# Patient Record
Sex: FEMALE | Race: WHITE
[De-identification: names, ages, dates, MRNs, and addresses within clinical notes are randomized per-mention and may not be internally consistent; named-entity substitution may affect disease eponyms.]

---

## 2020-01-14 NOTE — REP
Clinical:  Anatomical evaluation.

 

Comparison: None .

 

Findings:

Examination demonstrates a single live intrauterine pregnancy in breech

presentation.  Fetal motion is identified by technologist.  Placenta is noted

posterior and grade zero without evidence for placenta previa or abruption.

Amniotic fluid volume is normal.  Cervix measures 4.4 cm in length and appears

closed.  No evidence for nuchal cord.

 

Gestational age by LMP 18 weeks 3 days with JONATAN 06/12/2020 .

Gestational age by current measurements 19 weeks 1 day with JONATAN 06/07/2020 .

 

FHR equals 147 beats per minute.

BPD  4.4 cm     19 weeks 2 days

HC  16.1 cm     18 weeks 6 days

AC  14.0 cm     19 weeks 3 days

FL  2.8 cm      18 weeks 4 days

HL  2.9 cm      19 weeks 3 days

HC/AC ratio  1.15

 

Estimated fetal weight 271 grams ( 72nd percentile).

 

Anatomical assessment demonstrates normal structures including cranium, choroid

plexus, cavum, cerebellum/posterior fossa, facial features, lungs,diaphragm,

stomach, cord insertion/three-vessel cord, kidneys/bladder, spine, and

extremities.

 

Limited evaluation of the heart/ventricular outflow tracts and feet due to

positioning.

 

Impression:

1.  Single live intrauterine pregnancy in breech presentation demonstrating

appropriate interval growth.

2.  Anatomical limitations as noted above may warrant followup.

 

 

Electronically Signed by

Norberto Batista MD 01/14/2020 02:11 A

## 2020-03-26 NOTE — REP
OB ULTRASOUND:

 

Real-time sonographic evaluation of the gravid uterus is performed.  There is a

single living intrauterine gestation.  Estimated gestational age 28 weeks 6 days,

EDC 06/12/2020.  Today's measurements indicate appropriate growth.

 

BPD                      78 mm = 31 weeks 2 days, 84th percentile

 

HC                     271 mm = 29 weeks 4 days, 63rd percentile

 

AC                     258 mm = 30 weeks 0 days, 72nd percentile

 

Femur length             55 mm = 29 weeks 0 days, 53rd percentile

 

HC/AC ratio 1.05 within normal range of 0.99-1.18.  Estimated fetal weight 1442

grams, 61st percentile.  Cervix is closed and measures 3.4 cm in length.  Fetal

heart rate 136 beats per minute.  Amniotic fluid within normal limits, PHILIP 19.0

within normal range of 9.1-23.1.

 

   SEEN/GROSSLY UNREMARKABLE

 

Lateral ventricles                       Yes

 

Posterior fossa                          Yes

 

Upper lip                                    Yes

 

Four-chamber heart                   Yes

 

LVOT                                         Yes

 

RVOT                                        Yes

 

Stomach                                    Yes

 

Cord insertion                            Yes

 

Three vessel cord                      Yes

 

Kidneys                                      Yes

 

Bladder                                      Yes

 

Spine                                         Yes

 

Fetal position:  Vertex.

 

Placenta:  Anterior and grade 0 with no previa or abruption.

 

 

Electronically Signed by

Kulwant Nye MD 03/26/2020 04:58 P

## 2020-06-12 NOTE — REP
Clinical:  Growth evaluation.

 

Comparison: 03/26/2020 .

 

Findings:

Examination demonstrates a single live intrauterine pregnancy in cephalic

presentation.  Fetal motion is identified by technologist.  Placenta is noted

posterior and grade I I without evidence for placenta previa or abruption.

Amniotic fluid volume is normal.  Cervix appears closed.

 

Gestational age by LMP 39 weeks 6 days with JONATAN 06/12/2020 .

Gestational age by current measurements 36 weeks 5 days with JONATAN 07/04/2020 .

 

FHR equals 149 beats per minute.

Estimated fetal weight 3033 grams ( 20th percentile based on age by LMP and first

ultrasound ).

 

Impression:

Single live advanced gestation in cephalic presentation demonstrating appropriate

interval growth.  No obvious abnormality appreciated.

## 2020-06-19 NOTE — HPEPDOC
Obstetrical History & Physical


General


Date of Admission


2020 at 15:55





History of Present Illness


Chief Complaint:  Induction of labor


Information Provided By:  Patient


Age:  35


:  4


Term:  3


Pre-term:  0


Abortions:  0


Living:  3





Prenatal Care


Prenatal Care:  Limited Care (entry to care at 24 weeks)





Prenatal Dating


Final EDC:  2020


Final EDC by:  LMP


EGA at Admission:  41





Past Medical History


Past Obstetrical History #1:  


   Past Obstetrical History:  Primgravida ()


   Type of Delivery:  Spontaneous Vaginal Del.


   Sex of Infant:  Male (6#6)


   Complications:  No


Past Obstetrical History #2:  


   Past Obstetrical History:  Multigravida ()


   Type of Delivery:  Spontaneous Vaginal Del.


   Sex of Infant:  Male (8#13)


   Complications:  No


Past Obstetrical History #3:  


   Past Obstetrical History:  Multigravida ()


   Type of Delivery:  Ceserean section (failed induction)


   Sex of Infant:  Male (8#8)


   Complications:  Yes (oligohydramnios)


GYN History:  No pertinent history


Past Medical History


Medical History


hypothyroidism with small goiter, anemia


Surgical History:  Appendectomy,  section





Family History


Significant Family History:  Cancer, Heart disease





Social History


Marital Status:  Single


Family situation:  Spouse/partner home


Psychosocial History:  No pertinent psych hx


* Smoker:  current smoker


Alcohol:  Denies


Drugs:  denies





Abuse Violence Screening


Have you been hit/kicked/slapp:  No


Have you been sexually assault:  No





Prenatal Imunizations


Tdap status:  declined





Allergies


Coded Allergies:  


     No Known Drug Allergies (Verified  Allergy, Unknown, 20)





Medications


Miscellaneous Medications


Acetaminophen (Mapap) 500 Mg Tablet, 1,000 MG PO





Physical Examination


Physical Examination


GENERAL: Alert and oriented times three.


BREAST: .


ABDOMEN: Gravid and non-tender to touch.


FETUS: Is vertex (VTX) by sterile vaginal examination (SVE), fetus is vertex 

(VTX) by Leopold.


HEART RATE: Regular rate and rhythm.


LUNGS: Clear to auscultation (CTA).


EXTREMITIES: No edema. No clonus. Deep tendon reflexes (DTRs) + 2.





Vital Signs/I&O





Vital Signs








  Date Time  Temp Pulse Resp B/P (MAP) Pulse Ox O2 Delivery O2 Flow Rate FiO2


 


20 16:19 97.5 86 18 122/71 (88)    











Pertinent Laboratoy Data


Blood Type:  O-


RBC Antibody Screen:  Negative


HIV:  Unknown


Hepatitis B:  Unknown


Hepatitis C:  Unknown


Rapid Plasma Reagin:  Unknown


Rubella:  Unknown


Chlamydia/Gonorrhea:  Negative


Group B Streptococcus:  Negative


Quad Screen Test:  Declined


Glucose Tolerance Test:  121





Anatomy Ultrasound


Ultrasound Date:  2020


Placenta Location:  Posterior


Normal Anatomy:  Yes


Placenta Previa:  No


Estimated Fetal Weight (grams):  271 (72%)





Other Ultrasounds


3/26/2020 f/u anatomy PHILIP 19.0, normal anatomy. Appropriate growth


2020 cephalic.  Normal fluid. EFW 3033gm, 20%





 Steroid Therapy


 Steroid Therapy:  No





Vaginal Examination


Dilation:  2cm (-3)


Effacement:  80%


Station:  -3


Cervical Consistency:  Medium


Cervical Position:  Posterior


Fetal Presentation:  Cephalic presentation





Fetal Assessment


Fetal Heart Rate (FHR):  135


Variability:  Moderate


Accelerations:  Positive


Decelerations:  Other (deceleration after being on back)





Tocometer


Contractions:  No


Multi-drug resistant Organism:  No history of MDRO





Assessment/Plan


Assessment


Sri is a 35-year-old  (G)4 para (P)3-0-0-3 at 41+0 weeks by 18-week 

ultrasound. Presents to Labor and Delivery (L&D) for induction of labor due to 

postdates. History significant for 2 vaginal deliveries followed by  

section for failed induction. She has been thoroughly counseled regarding 

risks/benefits of TOLAC vs repeat  section. Pt desires TOLAC at this 

time. She reports good fetal movement. Denies LOF, bleeding or regular UC.





Plan


Admit and orient per consult Dr Hickman


 and consent.


Diet: Clear liquids.


Group B Streptococcus (GBS) negative.


Labs and intravenous (IV) per unit protocol.


Counseled on Pitocin and induction of labor (IOL).


Lactated Ringers (LR): Bolus 500 mL, then at 125 mL/hr.


Anticipate successful vaginal birth after . 


C-S as appropriate.











Lauryn Ventura CNM  2020 17:01

## 2020-06-19 NOTE — IPNPDOC
Text Note


Date of Service


The patient was seen on 6/19/20.





NOTE


Progress





Fetal tracing has improved with IV hydration, now Cat I


Pitocin started per protocol


Cooks catheter placed, inflated with 60ml/40ml NS





Dr Hickman updated on pt status





VS,Fishbone, I+O


VS, Fishbone, I+O


Laboratory Tests


6/19/20 17:04











Vital Signs








  Date Time  Temp Pulse Resp B/P (MAP) Pulse Ox O2 Delivery O2 Flow Rate FiO2


 


6/19/20 18:27  81 18 125/63 (83)    


 


6/19/20 18:12 97.9       

















Lauryn Ventura CNM  Jun 19, 2020 19:33

## 2020-06-19 NOTE — IPNPDOC
Text Note


Date of Service


The patient was seen on 6/19/20.





NOTE


Progress





Cooks catheter out around 2030. SVE 4-5 per nursing


Pitocin was up to 6mu. Recurrent variable/late decels to 70's noted, pitocin 

decreased to 2mu.


Cat II tracing. FH recovers with variability following decel


Dr Hickman updated on pt status.





VS,Fishbone, I+O


VS, Fishbone, I+O


Laboratory Tests


6/19/20 17:04











Vital Signs








  Date Time  Temp Pulse Resp B/P (MAP) Pulse Ox O2 Delivery O2 Flow Rate FiO2


 


6/19/20 19:17  77 18 122/66 (84)  Room Air  


 


6/19/20 18:12 97.9       

















Lauryn Ventura CNM  Jun 19, 2020 23:33

## 2020-06-20 NOTE — IPNPDOC
Text Note


Date of Service


The patient was seen on 6/20/20.





NOTE


Progress 





Pt has been comfortable with epidural


Persistent variable/late decels noted with recovery, Cat II


Pitocin 2mu turned off


SVE 7-8/90/-1


AROM small amount clear fluid. FSE placed.





VS,Fishbone, I+O


VS, Fishbone, I+O


Laboratory Tests


6/19/20 17:04











Vital Signs








  Date Time  Temp Pulse Resp B/P (MAP) Pulse Ox O2 Delivery O2 Flow Rate FiO2


 


6/20/20 04:57  60 18 93/54 (67)  Room Air  


 


6/20/20 04:20 98.0       














I&O- Last 24 Hours up to 6 AM 


 


 6/20/20





 06:00


 


Intake Total 3326 ml


 


Output Total 1500 ml


 


Balance 1826 ml

















Lauryn Ventura CNM  Jun 20, 2020 05:42

## 2020-06-20 NOTE — IPNPDOC
Text Note


Date of Service


The patient was seen on 6/20/20.





NOTE


Progress





Becoming more uncomfortable


Pitocin @ 2mu


UC 2-4 minutes x 60+ seconds, moderate


FH mostly Cat I with episodes Cat II


SVE 6/80/-2, moderate bloody show





Pt is considering epidural





VS,Fishbone, I+O


VS, Fishbone, I+O


Laboratory Tests


6/19/20 17:04











Vital Signs








  Date Time  Temp Pulse Resp B/P (MAP) Pulse Ox O2 Delivery O2 Flow Rate FiO2


 


6/20/20 00:26  62 18 106/63 (77)  Room Air  


 


6/19/20 18:12 97.9       














I&O- Last 24 Hours up to 6 AM 


 


 6/20/20





 06:00


 


Intake Total 1956 ml


 


Output Total 1500 ml


 


Balance 456 ml

















Lauryn Ventura  Jun 20, 2020 01:51

## 2020-06-23 NOTE — RO
DATE OF PROCEDURE:  2020

 

PREOPERATIVE DIAGNOSIS:  41 weeks, trial of labor after , arrest of

dilation.

 

POSTOPERATIVE DIAGNOSIS:  41 weeks, trial of labor after , arrest of

dilation.

 

PROCEDURE:  Repeat low transverse  section.

 

SURGEON:  Ivan Hickman MD

 

ASSISTANT:  Doris Bundy CNM

 

ANESTHESIA:  Epidural.

 

ESTIMATED BLOOD LOSS:  500 mL.

 

URINE OUTPUT:  100 mL.

 

IV FLUIDS:  1700 mL lactated Ringer (LR).

 

FINDINGS:  2120 gram (6 pound 14 ounce) male infant.  Apgar score 7 and 9.

Moderate meconium present.  Nuchal cord times three.  Normal uterus, fallopian

tubes, and ovaries.

 

OPERATIVE SUMMARY:

The patient was taken to the operating room where epidural anesthesia was

adequate.  She was prepped and draped in sterile fashion in the supine position.

A Villanueva catheter was already in place.

 

A Pfannenstiel skin incision was made with the scalpel and carried through to the

fascia.  The fascia was nicked and extended.  The fascia was dissected off the

rectus muscles.  The peritoneal cavity was entered.  A bladder flap was created.

A Mobius retractor was placed.

 

A curvilinear incision was made in the lower uterine segment until meconium

stained fluid was noted.  This was extended manually.  The infant was delivered

from the vertex position without difficulty.  The cord was doubly clamped and

cut.  The infant was handed off to the awaiting nurses.

 

The placenta was expressed.  The uterus was closed with #0 Vicryl in a running

locked fashion.  A second imbricating layer of #0 Vicryl was placed.  The Mobius

retractor was removed.

 

The peritoneum was closed with #2-0 Vicryl in a running fashion.  The fascia was

closed with #0 Vicryl in a running fashion.  Deep layer was irrigated and closed

with #2-0 chromic.  The skin was closed with #4-0 Monocryl and subcuticular

sutures.

 

Sponge, instrument, and needle counts were correct.

## 2020-06-24 NOTE — DSES
DATE OF ADMISSION:  2020

DATE OF DISCHARGE:  2020

 

35-year-old,  (G) 4, para (P) 3 female, at 41 weeks gestation, presents

for induction of labor due to post dates.  She has a history of one prior

 section after two prior vaginal deliveries.

 

HOSPITAL COURSE:

The patient was admitted on 2020.  She had labor induced with oxytocin.

She also had an intracervical Cook's catheter placed.  She made slow progress in

labor.  She was noted to have recurrent fetal heart decelerations during the

induction process, which limited the ability to maintain or increase oxytocin

levels.  She was subsequently diagnosed with arrest of dilation.

 

On 2020, the patient underwent primary  section for a 6 pound 14

ounce male infant, Apgar score 7 and 9.  Surgery was significant for nuchal cord

times three noted at the time of surgery.

 

Her postoperative course was unremarkable.  She had adequate return of bladder

and bowel function.  Her postoperative hemoglobin was 9.3 g/dL.  She was deemed

stable for discharge on postoperative day #2.

 

ADMISSION DIAGNOSIS:

Pregnancy 41 weeks, prior  section times one.

 

DISCHARGE DIAGNOSIS:  Delivered.

 

PROCEDURE:  Repeat low transverse  section.

 

DISPOSITION:

The patient will followup with Dr. Hickman in 2 weeks.  Instructions reviewed.

## 2021-02-16 ENCOUNTER — HOSPITAL ENCOUNTER (EMERGENCY)
Dept: HOSPITAL 53 - M ED | Age: 36
Discharge: HOME | End: 2021-02-16
Payer: COMMERCIAL

## 2021-02-16 VITALS — HEIGHT: 65 IN | BODY MASS INDEX: 28.43 KG/M2 | WEIGHT: 170.64 LBS

## 2021-02-16 VITALS — DIASTOLIC BLOOD PRESSURE: 73 MMHG | SYSTOLIC BLOOD PRESSURE: 114 MMHG

## 2021-02-16 DIAGNOSIS — D50.9: ICD-10-CM

## 2021-02-16 DIAGNOSIS — Z79.3: ICD-10-CM

## 2021-02-16 DIAGNOSIS — N93.9: ICD-10-CM

## 2021-02-16 DIAGNOSIS — Z87.448: ICD-10-CM

## 2021-02-16 DIAGNOSIS — F17.200: ICD-10-CM

## 2021-02-16 DIAGNOSIS — N93.8: Primary | ICD-10-CM

## 2021-02-16 LAB
ALBUMIN SERPL BCG-MCNC: 3.9 GM/DL (ref 3.2–5.2)
ALT SERPL W P-5'-P-CCNC: 28 U/L (ref 12–78)
APPEARANCE UR: (no result)
APTT BLD: 25.2 SECONDS (ref 24.2–38.5)
B-HCG SERPL QL: NEGATIVE
BACTERIA UR QL AUTO: NEGATIVE
BASOPHILS # BLD AUTO: 0.1 10^3/UL (ref 0–0.2)
BASOPHILS NFR BLD AUTO: 0.9 % (ref 0–1)
BILIRUB CONJ SERPL-MCNC: 0.3 MG/DL (ref 0–0.2)
BILIRUB SERPL-MCNC: 1 MG/DL (ref 0.2–1)
BILIRUB UR QL STRIP.AUTO: NEGATIVE
BUN SERPL-MCNC: 12 MG/DL (ref 7–18)
CALCIUM SERPL-MCNC: 8.6 MG/DL (ref 8.5–10.1)
CHLORIDE SERPL-SCNC: 109 MEQ/L (ref 98–107)
CO2 SERPL-SCNC: 25 MEQ/L (ref 21–32)
CREAT SERPL-MCNC: 0.77 MG/DL (ref 0.55–1.3)
EOSINOPHIL # BLD AUTO: 0 10^3/UL (ref 0–0.5)
EOSINOPHIL NFR BLD AUTO: 0 % (ref 0–3)
GFR SERPL CREATININE-BSD FRML MDRD: > 60 ML/MIN/{1.73_M2} (ref 60–?)
GLUCOSE SERPL-MCNC: 104 MG/DL (ref 70–100)
GLUCOSE UR QL STRIP.AUTO: NEGATIVE MG/DL
HCT VFR BLD AUTO: 40.6 % (ref 36–47)
HGB BLD-MCNC: 13.6 G/DL (ref 12–15.5)
HGB UR QL STRIP.AUTO: (no result)
INR PPP: 0.96
KETONES UR QL STRIP.AUTO: NEGATIVE MG/DL
LEUKOCYTE ESTERASE UR QL STRIP.AUTO: NEGATIVE
LIPASE SERPL-CCNC: 81 U/L (ref 73–393)
LYMPHOCYTES # BLD AUTO: 2 10^3/UL (ref 1.5–5)
LYMPHOCYTES NFR BLD AUTO: 30.2 % (ref 24–44)
MCH RBC QN AUTO: 29.9 PG (ref 27–33)
MCHC RBC AUTO-ENTMCNC: 33.5 G/DL (ref 32–36.5)
MCV RBC AUTO: 89.2 FL (ref 80–96)
MONOCYTES # BLD AUTO: 0.3 10^3/UL (ref 0–0.8)
MONOCYTES NFR BLD AUTO: 5.3 % (ref 2–8)
NEUTROPHILS # BLD AUTO: 4.1 10^3/UL (ref 1.5–8.5)
NEUTROPHILS NFR BLD AUTO: 63.4 % (ref 36–66)
NITRITE UR QL STRIP.AUTO: NEGATIVE
PH UR STRIP.AUTO: 7 UNITS (ref 5–9)
PLATELET # BLD AUTO: 160 10^3/UL (ref 150–450)
POTASSIUM SERPL-SCNC: 3.8 MEQ/L (ref 3.5–5.1)
PROT SERPL-MCNC: 7.3 GM/DL (ref 6.4–8.2)
PROT UR QL STRIP.AUTO: (no result) MG/DL
PROTHROMBIN TIME: 13 SECONDS (ref 12.5–14.3)
RBC # BLD AUTO: 4.55 10^6/UL (ref 4–5.4)
RBC # UR AUTO: (no result) /HPF (ref 0–3)
SODIUM SERPL-SCNC: 140 MEQ/L (ref 136–145)
SP GR UR STRIP.AUTO: 1.01 (ref 1–1.03)
SQUAMOUS #/AREA URNS AUTO: 0 /HPF (ref 0–6)
UROBILINOGEN UR QL STRIP.AUTO: 0.2 MG/DL (ref 0–2)
WBC # BLD AUTO: 6.5 10^3/UL (ref 4–10)
WBC #/AREA URNS AUTO: 0 /HPF (ref 0–3)

## 2021-02-16 NOTE — REP
INDICATION:

vaginal bleeding



COMPARISON:

None.



TECHNIQUE:

Transabdominal pelvic ultrasound followed by transvaginal examination for better

evaluation of the endometrium and adnexa with color Doppler evaluation of the ovaries.



FINDINGS:

Bladder is unremarkable and measures 7.5 x 3.5 x 6.0 cm.



Normal retroverted uterus measures 8.0 x 5.5 x 5.7 cm.  The endometrial complex

measures 10 mm thickness with heterogeneous endocervical fluid consistent with

hemorrhagic debris.  No discrete uterine or endometrial abnormalities are appreciated.



Bilateral ovaries are normal in appearance and vascularity without evidence for

torsion.  Right ovary measures 2.6 x 1.6 x 2.1 cm; R I = 0.58.  Left ovary measures

2.1 x 2.4 x 2.7 cm; R I = 0.49.



No pelvic fluid or adnexal mass lesion



IMPRESSION:

Hemorrhagic debris within the endocervical canal.

Otherwise normal pelvic ultrasound.





<Electronically signed by Norberto Batista > 02/16/21 [id number]

## 2021-03-17 ENCOUNTER — HOSPITAL ENCOUNTER (OUTPATIENT)
Dept: HOSPITAL 53 - M SFHCWAGY | Age: 36
End: 2021-03-17
Attending: OBSTETRICS & GYNECOLOGY
Payer: COMMERCIAL

## 2021-03-17 DIAGNOSIS — Z12.4: Primary | ICD-10-CM

## 2021-03-17 PROCEDURE — 87624 HPV HI-RISK TYP POOLED RSLT: CPT

## 2022-07-01 NOTE — CCD
Summarization Of Episode

                             Created on: 2021



MERLE GRISSOM

External Reference #: [id number]

: 1985

Sex: Female



Demographics





                          Address                   [address] [place]  [address]

 

                          Home Phone                [phone]

 

                          Preferred Language        Unknown

 

                          Marital Status            

 

                          Rastafarian Affiliation     NONE

 

                          Race                      White

 

                          Ethnic Group              Not  or 





Author





                          Author                    [facility]

 

                          Organization              [facility]

 

                          Address                   Unknown

 

                          Phone                     Unavailable







Support





                Name            Relationship    Address         Phone

 

                    WATNTIMES           Next Of Kin         [address] [place]  [address]                    [phone]

 

                    [place]  [address]                    [id number]

 

                UE              Next Of Kin     Unknown         Unavailable

 

                    CAREGIVERS          Next Of Kin         [address] [place]  [address]                    [phone]

 

                    HENRI GRISSOM    Next Of Kin         [address] [place]  [address]                [phone]

 

                    Henri Grissom    ECON                [address] [place]  [address]                 Unavailable







Care Team Providers





                    Care Team Member Name Role                Phone

 

                    Willis, L Rolanda FNP Unavailable         Unavailable

 

                    Willis, L Rolanda FNP Unavailable         Unavailable

 

                    Willis, L Rolanda FNP Unavailable         Unavailable

 

                    Willis, L Rolanda FNP Unavailable         Unavailable

 

                    Willis, L Rolanda FNP Unavailable         Unavailable

 

                    Willis, L Rolanda FNP Unavailable         Unavailable

 

                    Willis, L Rolanda FNP Unavailable         Unavailable

 

                    Willis, L Rolanda FNP Unavailable         Unavailable

 

                    Willis, L Rolanda FNP Unavailable         Unavailable

 

                    Willis, L Rolanda FNP Unavailable         Unavailable

 

                    Willis, L Rolanda FNP Unavailable         Unavailable

 

                    Willis, L Rolanda FNP Unavailable         Unavailable

 

                    Willis, L Rolanda FNP Unavailable         Unavailable

 

                    Willis, L Rolanda FNP Unavailable         Unavailable

 

                    Willis, L Rolanda FNP Unavailable         Unavailable

 

                    Willis, L Rolanda FNP Unavailable         Unavailable

 

                    Willis, L Rolanda FNP Unavailable         Unavailable

 

                    Willis, L Rolanda FNP Unavailable         Unavailable

 

                    Willis, L Rolanda FNP Unavailable         Unavailable

 

                    Willis, L Rolanda FNP Unavailable         Unavailable

 

                    Willis, L Rolanda FNP Unavailable         Unavailable

 

                    Willis, L Rolanda FNP Unavailable         Unavailable

 

                    Willis, L Rolanda FNP Unavailable         Unavailable

 

                    Willis, L Rolanda FNP Unavailable         Unavailable

 

                    Willis, L Rolanda FNP Unavailable         Unavailable

 

                    Willis, L Rolanda FNP Unavailable         Unavailable

 

                    Willis, L Rolanda FNP Unavailable         Unavailable

 

                    Willis, L Rolanda FNP Unavailable         Unavailable

 

                    Willis, L Rolanda FNP Unavailable         Unavailable

 

                    Willis, L Rolanda FNP Unavailable         Unavailable

 

                    Willis, L Rolanda FNP Unavailable         Unavailable

 

                    Willis, L Rolanda FNP Unavailable         Unavailable

 

                    Willis, L Rolanda FNP Unavailable         Unavailable

 

                    Willis, L Rolanda FNP Unavailable         Unavailable

 

                    Willis, L Rolanda FNP Unavailable         Unavailable

 

                    Willis, L Rolanda FNP Unavailable         Unavailable

 

                    Willis, L Rolanda FNP Unavailable         Unavailable

 

                    Willis, L Rolanda FNP Unavailable         Unavailable

 

                    Willis, L Rolanda FNP Unavailable         Unavailable

 

                    Willis, L Rolanda FNP Unavailable         Unavailable



                                  



Re-disclosure Warning

          The records that you are about to access may contain information from 
federally-assisted alcohol or drug abuse programs. If such information is 
present, then the following federally mandated warning applies: This information
has been disclosed to you from records protected by federal confidentiality 
rules (42 CFR part 2). The federal rules prohibit you from making any further 
disclosure of this information unless further disclosure is expressly permitted 
by the written consent of the person to whom it pertains or as otherwise 
permitted by 42 CFR part 2. A general authorization for the release of medical 
or other information is NOT sufficient for this purpose. The Federal rules 
restrict any use of the information to criminally investigate or prosecute any 
alcohol or drug abuse patient.The records that you are about to access may 
contain highly sensitive health information, the redisclosure of which is 
protected by Article 27-F of the [facility] Public Health law. If you 
continue you may have access to information: Regarding HIV / AIDS; Provided by 
facilities licensed or operated by the [facility] Office of Mental Health; 
or Provided by the [facility] Office for People With Developmental 
Disabilities. If such information is present, then the following [place] 
mandated warning applies: This information has been disclosed to you from 
confidential records which are protected by state law. State law prohibits you 
from making any further disclosure of this information without the specific 
written consent of the person to whom it pertains, or as otherwise permitted by 
law. Any unauthorized further disclosure in violation of state law may result in
a fine or senior living sentence or both. A general authorization for the release of 
medical or other information is NOT sufficient authorization for further disc
losure.                                                                         
    



Family History

          



             Family Member Name Family Member Gender Family Member Status Date o

f Status 

Description                             Data Source(s)

 

           Unknown    Male       Problem                          [facility]



                                                                                
       



Encounters

          



           Encounter  Providers  Location   Date       Indications Data Source(s

)

 

                ([place]ORDAN [place]) [facility] Nurse Visit                 [address] [place] [address] 

2020 12:00:00 AM EST                           eCW1 ([facility])

 

                Outpatient                      [address] [facility] 

[place] [address] 2020 12:00:00 AM

EST                                                 eCW1 ([facility])

 

                Unknown                         [address] [facility] [address] 2020 12:00:00 AM 

EST                                                 eCW1 ([facility])

 

                    (WC 15ESGYN) enter 15 min est gyn                     [address]

 [place] 

[address]          2020 12:00:00 AM EDT                     eCW1 (Critical access hospital)

 

           Outpatient Referrer: Rolanda NUÑEZ            2020 06:03

:00 AM EDT            

[facility]

 

                (WC ESTOB) [facility] Est OB                 [address] [place] [address] 

2020 12:00:00 AM EDT                           eCW1 ([facility])

 

                (WC ESTOB) [facility]                 [address] [place] [address] 

2020 12:00:00 AM EDT                           eCW1 ([facility])

 

                (WC ESTOB) enter Est OB                 [address] [place] [address] 

2020 12:00:00 AM EDT                           eCW1 ([facility])

 

                    [facility]                     [address]

[address] [place] 

[address]          2020 12:00:00 AM EDT                     eCW1 (Critical access hospital)

 

                    [facility]                     [address]

[address] [place] 

[address]          2020 12:00:00 AM EDT                     eCW1 (Critical access hospital)

 

                    [facility]                     [address]

[address] [place] 

[address]          04/10/2020 12:00:00 AM EDT                     eCW1 (Critical access hospital)

 

                    [facility]                     [address]

[address] [place] 

[address]          2020 12:00:00 AM EDT                     eCW1 (Critical access hospital)

 

           Outpatient Referrer: Rolanda NUÑEZ            2020 05:18

:00 AM EDT            

[facility]

 

                [facility]                 [address] [place] [address] 2020 

12:00:00 AM EDT                                     eCW1 ([facility])

 

                    [facility]                     [address]

[address] [place] 

[address]          2020 12:00:00 AM EST                     eCW1 (Critical access hospital)

 

                [facility]                 [address] [place] [address] 2020 

12:00:00 AM EST                                     eCW1 ([facility])

 

           Outpatient Referrer: Rolanda NUÑEZ            2020 09:39

:00 PM EST            

[facility]

 

                    [facility]                     [address]

[address] [place] 

[address]          2020 12:00:00 AM EST                     eCW1 (Critical access hospital)



                                                                                
                                                                                
                                                                                
               



Immunizations

          



             Vaccine      Date         Status       Description  Data Source(s)

 

                    Depo-Provera 150mg/1mL (Medroxy-Progestrone Acetate) 

020 01:41:00 PM EST 

completed                                           eCW1 ([facility])

 

                    Depo-Provera 150mg/1mL (Medroxy-Progestrone Acetate)  01:41:00 PM EST 

completed                                           eCW1 ([facility])

 

                    RHo (D) Immune Globulin 300mcg/1.5mL (RhoGAM) 04/10/2020 11:

25:00 AM EDT 

completed                                           eCW1 ([facility])

 

                    RHo (D) Immune Globulin 300mcg/1.5mL (RhoGAM) 04/10/2020 11:

25:00 AM EDT 

completed                                           eCW1 ([facility])

 

                    RHo (D) Immune Globulin 300mcg/1.5mL (RhoGAM) 04/10/2020 11:

25:00 AM EDT 

completed                                           eCW1 ([facility])

 

                    RHo (D) Immune Globulin 300mcg/1.5mL (RhoGAM) 04/10/2020 11:

25:00 AM EDT 

completed                                           eCW1 ([facility])

 

                    RHo (D) Immune Globulin 300mcg/1.5mL (RhoGAM) 04/10/2020 11:

25:00 AM EDT 

completed                                           eCW1 ([facility])



                                                                                
                                                                   



Medications

          



          Medication Brand Name Start Date Product Form Dose      Route     Admi

nistrative 

Instructions Pharmacy Instructions Status     Indications Reaction   Description

 Data 

Source(s)

 

                                        Amoxicillin 875 MG / Clavulanate 125 MG 

Oral Tablet Amoxicillin-Pot Clavulanate 

875-125 MG      Amoxicillin-Pot Clavulanate 875-125 MG 2020 12:00:00 AM ES

T                 

1.0 {tablet}                         active                  Amoxicillin-Pot Cla

vulanate 875-125 MG eCW1 

([facility])

 

                    Fluticasone Propionate 50 MCG/ACT Fluticasone Propionate 50 

MCG/ACT 2020 

12:00:00 AM EST         1.0 {spray_in_each_nostril}                         acti

ve                  Fluticasone 

Propionate 50 MCG/ACT                   eCW1 ([facility])

 

                    Fluticasone Propionate 50 MCG/ACT Fluticasone Propionate 50 

MCG/ACT 2020 

12:00:00 AM EST         1.0 {spray_in_each_nostril}                         acti

ve                  Fluticasone 

Propionate 50 MCG/ACT                   eCW1 ([facility])

 

                    Fluticasone Propionate 50 MCG/ACT Fluticasone Propionate 50 

MCG/ACT 2020 

12:00:00 AM EST         1.0 {spray_in_each_nostril}                         acti

ve                  Fluticasone 

Propionate 50 MCG/ACT                   eCW1 ([facility])

 

                                        Amoxicillin 875 MG / Clavulanate 125 MG 

Oral Tablet Amoxicillin-Pot Clavulanate 

875-125 MG      Amoxicillin-Pot Clavulanate 875-125 MG 2020 12:00:00 AM ES

T                 

1.0 {tablet}                         active                  Amoxicillin-Pot Cla

vulanate 875-125 MG eCW1 

([facility])

 

                                        Amoxicillin 875 MG / Clavulanate 125 MG 

Oral Tablet Amoxicillin-Pot Clavulanate 

875-125 MG      Amoxicillin-Pot Clavulanate 875-125 MG 2020 12:00:00 AM ES

T                 

1.0 {tablet}                         active                  Amoxicillin-Pot Cla

vulanate 875-125 MG eCW1 

([facility])

 

                    Citalopram 10 MG Oral Tablet [Celexa] Celexa 10 MG Celexa 10

 MG        2020 

12:00:00 AM EDT        1.0 {tablet}                      active               Ce

siomne 10 MG eCW1 ([facility])

 

                    Citalopram 10 MG Oral Tablet [Celexa] Celexa 10 MG Celexa 10

 MG        2020 

12:00:00 AM EDT        1.0 {tablet}                      active               Ce

simone 10 MG eCW1 ([facility])

 

                    Citalopram 10 MG Oral Tablet [Celexa] Celexa 10 MG Celexa 10

 MG        2020 

12:00:00 AM EDT        1.0 {tablet}                      active               Ce

simone 10 MG eCW1 ([facility])

 

                    Citalopram 10 MG Oral Tablet [Celexa] Celexa 10 MG Celexa 10

 MG        2020 

12:00:00 AM EDT        1.0 {tablet}                      active               Ce

simone 10 MG eCW1 ([facility])

 

          5-325 mg            2020 12:00:00 AM EDT tablet    20           

       TAKE ONE TABLET BY MOUTH THREE

 TIMES A DAY AS NEEDED FOR PAIN, MAXIMUM DAILY DOSE = THREE TABLETS TAKE ONE 

TABLET BY MOUTH THREE TIMES A DAY AS NEEDED FOR PAIN, MAXIMUM DAILY DOSE = THREE
 TABLETS     SOLD: 2020                                        [facility]

s



                                                                                
                                                                                
        



Insurance Providers

          



             Payer name   Policy type / Coverage type Policy ID    Covered party

 ID Covered 

party's relationship to lyman Policy Lyman             Plan Information

 

          [employer] [place] /           [id number]           [id number]      

           [id number]

 

          [employer] [place] /307           [id number]           [facility]      

           [id number]

 

          [facility] BCBS B         [id number]           P                   TNY

[id number]

 

                    [employer] [id number]        

                       

[id number]

 

                    [employer] [id number]        

                       

[id number]

 

                    [employer] [id number]        

                       

[id number]

 

                    [employer] [id number]        

                       

[id number]

 

                    [employer] [id number]        

                       

[id number]

 

                    [employer] [id number]        

                       

[id number]

 

          [employer] [place] [employer] [id number]           Family Dependent 

          [id number]

 

          [employer] [place]        [id number]           SPO                 TNY

[id number]

 

          [employer] B         [id number]           P                   TNY

[id number]

 

          [employer] B         [id number]           P                   RWB

[id number]

 

          [employer] [place] /[employer]           [id number]           [id number]          

       [id number]

 

          [employer] [place] 332/834           [id number]           2             

    [id number]

 

          [employer] [place] 332/834           [id number]           2             

    [id number]

 

          [employer]BS E[employer] BC        [id number]           SPO                 [id number]

[id number]

 

          [employer]BS E[employer] BC        [id number]                               [id number]

[id number]



                                                                                
                                                                                
                                                                                
                  



Problems, Conditions, and Diagnoses

          



           Code       Display Name Description Problem Type Effective Dates Data

 Source(s)

 

             F17.210      08446980     Nicotine dependence, cigarettes, uncompli

cated Problem      

2020 12:00:00 AM EDT              eCW1 ([facility])

 

             F17.210      27256836     Nicotine dependence, cigarettes, uncompli

cated Problem      

2020 12:00:00 AM EDT              eCW1 ([facility])

 

             Z34.80       Pregnancy care Supervision of other normal pregnancy P

roblem      2020 

12:00:00 AM EST                         eCW1 ([facility])

 

             Z34.80       Pregnancy care Supervision of other normal pregnancy P

roblem      2020 

12:00:00 AM EST                         eCW1 ([facility])



                                                                                
                                                



Surgeries/Procedures

          



             Procedure    Description  Date         Indications  Data Source(s)

 

             URINE PREGNANCY TEST              2020 12:00:00 AM EST       

       eCW1 ([facility])

 

             SUBSEQUENT PRENATAL CARE VISIT              2020 12:00:00 AM 

EDT              eCW1 ([facility])

 

             OB Visit                  2020 12:00:00 AM EDT              e

CW1 ([facility])

 

             INJECTION/IM              04/10/2020 12:00:00 AM EDT              e

CW1 ([facility])

 

             THER/PROPH/DIAG INJ, SC/IM              04/10/2020 12:00:00 AM EDT 

             eCW1 ([facility])

 

             RHO(D) IMMUNE GLOBULIN HUMAN FULL-DOSE IM              04/10/2020 1

2:00:00 AM EDT              eCW1 

([facility])



                                                                                
                                                          



Results

          



                    ID                  Date                Data Source

 

                    CHGCTV - CHLAMYDIA, GC & TRICH AMP (Microbiology) 2020

 12:00:00 AM EDT 

eCW1 ([facility])









          Name      Value     Range     Interpretation Code Description Data Barbara

rce(s) Supporting 

Document(s)

 

                      NOT DETECTED NEGATIVE              Trichomonas vaginalis (

AMP) eCW1 ([facility])                           









                                        Procedure

 

                                          



                                                                                
                  



Social History

          



           Code       Duration   Value      Status     Description Data Source(s

)

 

           Smoking    2020 12:00:00 AM EST Current Smoker completed  Curre

nt Smoker eCW1 

([facility])

 

           Smoking    2020 12:00:00 AM EST Current Smoker completed  Curre

nt Smoker eCW1 

([facility])

 

           Smoking    2020 12:00:00 AM EST Current Smoker completed  Curre

nt Smoker eCW1 

([facility])

 

           Smoking    2020 12:00:00 AM EDT Current Smoker completed  Curre

nt Smoker eCW1 

([facility])

 

           Smoking    2020 12:00:00 AM EDT Current Smoker completed  Curre

nt Smoker eCW1 

([facility])

 

           Smoking    2020 12:00:00 AM EDT Current Smoker completed  Curre

nt Smoker eCW1 

([facility])



                                                                                
                                                                    



Vital Signs

          



                    ID                  Date                Data Source

 

                    UNK                                      









           Name       Value      Range      Interpretation Code Description Data

 Source(s)

 

           Diastolic blood pressure 76 mm[Hg]                        76 mm[Hg]  

eCW1 ([facility])

 

           Systolic blood pressure 111 mm[Hg]                       111 mm[Hg] e

CW1 ([facility])

 

           Body temperature 97.9 [degF]                       97.9 [degF] eCW1 (

[facility])

 

           Respiratory rate 20 /min                          20 /min    eCW1 ([facility])

 

           Heart rate 76 /min                          76 /min    eCW1 ([facility])

 

           Body mass index (BMI) [Ratio] 27.29 kg/m2                       27.29

 kg/m2 eCW1 ([facility])

 

           Body height                                   [in_i]    eCW1 ([facility]

 

           Body weight 164 [lb_av]                       164 [lb_av] eCW1 ([facility])

 

           Diastolic blood pressure 70 mm[Hg]                        70 mm[Hg]  

eCW1 ([facility])

 

           Systolic blood pressure 126 mm[Hg]                       126 mm[Hg] e

CW1 ([facility])

 

           Body mass index (BMI) [Ratio] 27.85 kg/m2                       27.85

 kg/m2 eCW1 ([facility])

 

           Body height                                   [in_i]    [facility]

 

           Body weight 167.4 [lb_av]                       167.4 [lb_av] eCW1 ([facility])

 

           Diastolic blood pressure 74 mm[Hg]                        74 mm[Hg]  

eCW1 ([facility])

 

           Systolic blood pressure 126 mm[Hg]                       126 mm[Hg] e

CW1 ([facility])

 

           Body mass index (BMI) [Ratio] 45.787 kg/m2                       45.7

87 kg/m2 eCW1 ([facility])

 

           Body height                                   [in_i]    eCW1 ([facility]

 

           Body weight 197 [lb_av]                       197 [lb_av] eCW1 ([facility])

 

           Diastolic blood pressure 60 mm[Hg]                        60 mm[Hg]  

eCW1 ([facility])

 

           Systolic blood pressure 136 mm[Hg]                       136 mm[Hg] e

CW1 ([facility])

 

           Body mass index (BMI) [Ratio] 45.09 kg/m2                       45.09

 kg/m2 W1 ([facility])

 

           Body height                                   [in_i]    eCW1 ([facility]

 

           Body weight 194 [lb_av]                       194 [lb_av] eCW1 ([facility])

 

           Diastolic blood pressure 72 mm[Hg]                        72 mm[Hg]  

eCW1 ([facility])

 

           Systolic blood pressure 130 mm[Hg]                       130 mm[Hg] e

CW1 ([facility])

 

           Body mass index (BMI) [Ratio] 45.136 kg/m2                       45.1

36 kg/m2 eCW1 ([facility])

 

           Body height                                   [in_i]    eCW1 ([facility]

 

           Body weight 194.2 [lb_av]                       194.2 [lb_av] eCW1 ([facility])

 

           Diastolic blood pressure 68 mm[Hg]                        68 mm[Hg]  

eCW1 ([facility])

 

           Systolic blood pressure 122 mm[Hg]                       122 mm[Hg] e

CW1 ([facility])

 

           Body mass index (BMI) [Ratio] 44.857 kg/m2                       44.8

57 kg/m2 eCW1 ([facility])

 

           Body height                                   [in_us]   eCW1 ([facility]

 

           Body weight Measured 193 [lb_av]                       193 [lb_av] eC

W1 ([facility])

 

           Diastolic blood pressure 68 mm[Hg]                        68 mm[Hg]  

eCW1 ([facility])

 

           Systolic blood pressure 116 mm[Hg]                       116 mm[Hg] e

CW1 ([facility])

 

           Body mass index (BMI) [Ratio] 44.16 kg/m2                       44.16

 kg/m2 eCW1 ([facility])

 

           Body height                                   [in_us]   eCW1 ([facility]

 

           Body weight Measured 190 [lb_av]                       190 [lb_av] eC

W1 ([facility])

 

           Diastolic blood pressure 64 mm[Hg]                        64 mm[Hg]  

eCW1 ([facility])

 

           Systolic blood pressure 110 mm[Hg]                       110 mm[Hg] e

CW1 ([facility])

 

           Body mass index (BMI) [Ratio] 31.08 kg/m2                       31.08

 kg/m2 eCW1 ([facility])

 

           Body height                                   [in_us]   eCW1 ([facility]

 

           Body weight Measured 186.8 [lb_av]                       186.8 [lb_av

] eCW1 ([facility])

 

           Diastolic blood pressure 68 mm[Hg]                        68 mm[Hg]  

eCW1 ([facility])

 

           Systolic blood pressure 112 mm[Hg]                       112 mm[Hg] e

CW1 ([facility])

 

           Body mass index (BMI) [Ratio] 30.12 kg/m2                       30.12

 kg/m2 eCW1 ([facility])

 

           Body height                                   [in_us]   eCW1 ([facility]

 

           Body weight Measured 181 [lb_av]                       181 [lb_av] eC

W1 ([facility])

 

           Diastolic blood pressure 76 mm[Hg]                        76 mm[Hg]  

eCW1 ([facility])

 

           Systolic blood pressure 118 mm[Hg]                       118 mm[Hg] e

CW1 ([facility])

 

           Body mass index (BMI) [Ratio] 29.75 kg/m2                       29.75

 kg/m2 eCW1 ([facility])

 

           Body height                                   [in_us]   eCW1 ([facility]

 

           Body weight Measured 178.8 [lb_av]                       178.8 [lb_av

] eCW1 ([facility])



                                                                                
                  



Patient Treatment Plan of Care

          



             Planned Activity Planned Date Details      Description  Data Source

(s)

 

             Fluticasone Propionate 50 MCG/ACT 2020 12:00:00 AM EST       

                    eCW1 ([facility])

 

                    Amoxicillin 875 MG / Clavulanate 125 MG Oral Tablet 20 12:00:00 AM EST  

                                                    eCW1 ([facility])

 

             Fluticasone Propionate 50 MCG/ACT 2020 12:00:00 AM EST       

                    eCW1 ([facility])

 

                    Amoxicillin 875 MG / Clavulanate 125 MG Oral Tablet 20 12:00:00 AM EST  

                                                    eCW1 ([facility])

 

             Fluticasone Propionate 50 MCG/ACT 2020 12:00:00 AM EST       

                    eCW1 ([facility])

 

                    Amoxicillin 875 MG / Clavulanate 125 MG Oral Tablet 20 12:00:00 AM EST  

                                                    eCW1 ([facility])

 

             Citalopram 10 MG Oral Tablet [Celexa] 2020 12:00:00 AM EDT   

                        eCW1 

([facility])
Summarization Of Episode

                             Created on: 2021



MERLE GRISSOM

External Reference #: [id number]

: 1985

Sex: Female



Demographics





                          Address                   [address] [place]  [address]

 

                          Home Phone                [phone]

 

                          Preferred Language        Unknown

 

                          Marital Status            

 

                          Pentecostal Affiliation     NONE

 

                          Race                      White

 

                          Ethnic Group              Not  or 





Author





                          Author                    [facility]

 

                          Organization              [facility]

 

                          Address                   Unknown

 

                          Phone                     Unavailable







Support





                Name            Relationship    Address         Phone

 

                    WATNTIMES           Next Of Kin         [address] [place]  [address]                    [phone]

 

                    [place]  [address]                    [id number]

 

                UE              Next Of Kin     Unknown         Unavailable

 

                    CAREGIVERS          Next Of Kin         [address] [place]  [address]                    [phone]

 

                    HENRI GRISSOM    Next Of Kin         [address] [place]  [address]                [phone]

 

                    Henri Grissom    ECON                [address] [place]  [address]                 Unavailable







Care Team Providers





                    Care Team Member Name Role                Phone

 

                    Willis, L Rolanda FNP Unavailable         Unavailable

 

                    Willis, L Rolanda FNP Unavailable         Unavailable

 

                    Willis, L Rolanda FNP Unavailable         Unavailable

 

                    Willis, L Rolanda FNP Unavailable         Unavailable

 

                    Willis, L Rolanda FNP Unavailable         Unavailable

 

                    Willis, L Rolanda FNP Unavailable         Unavailable

 

                    Willis, L Rolanda FNP Unavailable         Unavailable

 

                    Willis, L Rolanda FNP Unavailable         Unavailable

 

                    Willis, L Rolanda FNP Unavailable         Unavailable

 

                    Willis, L Rolanda FNP Unavailable         Unavailable

 

                    Willis, L Rolanda FNP Unavailable         Unavailable

 

                    Willis, L Rolanda FNP Unavailable         Unavailable

 

                    Willis, L Rolanda FNP Unavailable         Unavailable

 

                    Willis, L Rolanda FNP Unavailable         Unavailable

 

                    Willis, L Rolanda FNP Unavailable         Unavailable

 

                    Willis, L Rolanda FNP Unavailable         Unavailable

 

                    Willis, L Rolanda FNP Unavailable         Unavailable

 

                    Willis, L Rolanda FNP Unavailable         Unavailable

 

                    Willis, L Rolanda FNP Unavailable         Unavailable

 

                    Willis, L Rolanda FNP Unavailable         Unavailable

 

                    Willis, L Rolanda FNP Unavailable         Unavailable

 

                    Willis, L Rolanda FNP Unavailable         Unavailable

 

                    Willis, L Rolanda FNP Unavailable         Unavailable

 

                    Willis, L Rolanda FNP Unavailable         Unavailable

 

                    Willis, L Rolanda FNP Unavailable         Unavailable

 

                    Willis, L Rolanda FNP Unavailable         Unavailable

 

                    Willis, L Rolanda FNP Unavailable         Unavailable

 

                    Willis, L Rolanda FNP Unavailable         Unavailable

 

                    Willis, L Rolanda FNP Unavailable         Unavailable

 

                    Willis, L Rolanda FNP Unavailable         Unavailable

 

                    Willis, L Rolanda FNP Unavailable         Unavailable

 

                    Willis, L Rolanda FNP Unavailable         Unavailable

 

                    Willis, L Rolanda FNP Unavailable         Unavailable

 

                    Willis, L Rolanda FNP Unavailable         Unavailable

 

                    Willis, L Rolanda FNP Unavailable         Unavailable

 

                    Willis, L Rolanda FNP Unavailable         Unavailable

 

                    Willis, L Rolanda FNP Unavailable         Unavailable

 

                    Willis, L Rolanda FNP Unavailable         Unavailable

 

                    Willis, L Rolanda FNP Unavailable         Unavailable

 

                    Willis, L Rolanda FNP Unavailable         Unavailable



                                  



Re-disclosure Warning

          The records that you are about to access may contain information from 
federally-assisted alcohol or drug abuse programs. If such information is 
present, then the following federally mandated warning applies: This information
has been disclosed to you from records protected by federal confidentiality 
rules (42 CFR part 2). The federal rules prohibit you from making any further 
disclosure of this information unless further disclosure is expressly permitted 
by the written consent of the person to whom it pertains or as otherwise 
permitted by 42 CFR part 2. A general authorization for the release of medical 
or other information is NOT sufficient for this purpose. The Federal rules 
restrict any use of the information to criminally investigate or prosecute any 
alcohol or drug abuse patient.The records that you are about to access may 
contain highly sensitive health information, the redisclosure of which is 
protected by Article 27-F of the [facility] Public [facility] law. If you 
continue you may have access to information: Regarding HIV / AIDS; Provided by 
facilities licensed or operated by the [facility] Office of Mental Health; 
or Provided by the [facility] Office for People With Developmental 
Disabilities. If such information is present, then the following [place] 
mandated warning applies: This information has been disclosed to you from 
confidential records which are protected by state law. State law prohibits you 
from making any further disclosure of this information without the specific 
written consent of the person to whom it pertains, or as otherwise permitted by 
law. Any unauthorized further disclosure in violation of state law may result in
a fine or snf sentence or both. A general authorization for the release of 
medical or other information is NOT sufficient authorization for further disc
losure.                                                                         
    



Family History

          



             Family Member Name Family Member Gender Family Member Status Date o

f Status 

Description                             Data Source(s)

 

           Unknown    Male       Problem                          [facility]



                                                                                
       



Encounters

          



           Encounter  Providers  Location   Date       Indications Data Source(s

)

 

                (YORDAN [place]) [facility] Nurse Visit                 [address] [place] [address] 

2020 12:00:00 AM EST                           eCW1 ([facility])

 

                Outpatient                      [address] [facility] 

[place] [address] 2020 12:00:00 AM

EST                                                 eCW1 ([facility])

 

                Unknown                         [address] [facility] 

[place] [address] 2020 12:00:00 AM 

EST                                                 eCW1 ([facility])

 

                    (WC 15ESGYN) enter 15 min est gyn                     [address]

 [place] 

[address]          2020 12:00:00 AM EDT                     eCW1 ([facility])

 

           Outpatient Referrer: Rolanda NUÑEZ            2020 06:03

:00 AM EDT            

[facility]

 

                (WC ESTOB) [facility]                 [address] [place] [address] 

2020 12:00:00 AM EDT                           eCW1 ([facility])

 

                (WC ESTOB) [facility]                 [address] [place] [address] 

2020 12:00:00 AM EDT                           eCW1 ([facility])

 

                ( ESTOB) [facility]                 [address] [place] [address] 

2020 12:00:00 AM EDT                           eCW1 ([facility])

 

                    [facility]                     [address]

[address] [place] 

[address]          2020 12:00:00 AM EDT                     eCW1 ([facility])

 

                    [facility]                     [address]

[address] [place] 

[address]          2020 12:00:00 AM EDT                     eCW1 ([facility])

 

                    [facility]                     [address]

[address] [place] 

[address]          04/10/2020 12:00:00 AM EDT                     eCW1 ([facility])

 

                    [facility]                     [address]

[address] [place] 

[address]          2020 12:00:00 AM EDT                     eCW1 ([facility])

 

           Outpatient Referrer: Rolanda NUÑEZ            2020 05:18

:00 AM EDT            

[facility]

 

                [facility]                 [address] [place] [address] 2020 

12:00:00 AM EDT                                     eCW1 ([facility])

 

                    [facility]                     [address]

[address] [place] 

[address]          2020 12:00:00 AM EST                     eCW1 ([facility])

 

                [facility]                 [address] [place] [address] 2020 

12:00:00 AM EST                                     eCW1 ([facility])

 

           Outpatient Referrer: Rolanda NUÑEZ            2020 09:39

:00 PM EST            

[facility]

 

                    [facility]                     [address]

[address] [place] 

[address]          2020 12:00:00 AM EST                     eCW1 ([facility])



                                                                                
                                                                                
                                                                                
               



Immunizations

          



             Vaccine      Date         Status       Description  Data Source(s)

 

                    Depo-Provera 150mg/1mL (Medroxy-Progestrone Acetate) 

020 01:41:00 PM EST 

completed                                           eCW1 ([facility])

 

                    Depo-Provera 150mg/1mL (Medroxy-Progestrone Acetate)  01:41:00 PM EST 

completed                                           eCW1 ([facility])

 

                    RHo (D) Immune Globulin 300mcg/1.5mL (RhoGAM) 04/10/2020 11:

25:00 AM EDT 

completed                                           eCW1 ([facility])

 

                    RHo (D) Immune Globulin 300mcg/1.5mL (RhoGAM) 04/10/2020 11:

25:00 AM EDT 

completed                                           eCW1 ([facility])

 

                    RHo (D) Immune Globulin 300mcg/1.5mL (RhoGAM) 04/10/2020 11:

25:00 AM EDT 

completed                                           eCW1 ([facility])

 

                    RHo (D) Immune Globulin 300mcg/1.5mL (RhoGAM) 04/10/2020 11:

25:00 AM EDT 

completed                                           eCW1 ([facility])

 

                    RHo (D) Immune Globulin 300mcg/1.5mL (RhoGAM) 04/10/2020 11:

25:00 AM EDT 

completed                                           eCW1 ([facility])



                                                                                
                                                                   



Medications

          



          Medication Brand Name Start Date Product Form Dose      Route     Admi

nistrative 

Instructions Pharmacy Instructions Status     Indications Reaction   Description

 Data 

Source(s)

 

                                        Amoxicillin 875 MG / Clavulanate 125 MG 

Oral Tablet Amoxicillin-Pot Clavulanate 

875-125 MG      Amoxicillin-Pot Clavulanate 875-125 MG 2020 12:00:00 AM ES

T                 

1.0 {tablet}                         active                  Amoxicillin-Pot Cla

vulanate 875-125 MG eCW1 

([facility])

 

                    Fluticasone Propionate 50 MCG/ACT Fluticasone Propionate 50 

MCG/ACT 2020 

12:00:00 AM EST         1.0 {spray_in_each_nostril}                         acti

ve                  Fluticasone 

Propionate 50 MCG/ACT                   eCW1 ([facility])

 

                    Fluticasone Propionate 50 MCG/ACT Fluticasone Propionate 50 

MCG/ACT 2020 

12:00:00 AM EST         1.0 {spray_in_each_nostril}                         acti

ve                  Fluticasone 

Propionate 50 MCG/ACT                   eCW1 ([facility])

 

                    Fluticasone Propionate 50 MCG/ACT Fluticasone Propionate 50 

MCG/ACT 2020 

12:00:00 AM EST         1.0 {spray_in_each_nostril}                         acti

ve                  Fluticasone 

Propionate 50 MCG/ACT                   eCW1 ([facility])

 

                                        Amoxicillin 875 MG / Clavulanate 125 MG 

Oral Tablet Amoxicillin-Pot Clavulanate 

875-125 MG      Amoxicillin-Pot Clavulanate 875-125 MG 2020 12:00:00 AM ES

T                 

1.0 {tablet}                         active                  Amoxicillin-Pot Cla

vulanate 875-125 MG eCW1 

([facility])

 

                                        Amoxicillin 875 MG / Clavulanate 125 MG 

Oral Tablet Amoxicillin-Pot Clavulanate 

875-125 MG      Amoxicillin-Pot Clavulanate 875-125 MG 2020 12:00:00 AM ES

T                 

1.0 {tablet}                         active                  Amoxicillin-Pot Cla

vulanate 875-125 MG eCW1 

([facility])

 

                    Citalopram 10 MG Oral Tablet [Celexa] Celexa 10 MG Celexa 10

 MG        2020 

12:00:00 AM EDT        1.0 {tablet}                      active               Ce

simone 10 MG eCW1 ([facility])

 

                    Citalopram 10 MG Oral Tablet [Celexa] Celexa 10 MG Celexa 10

 MG        2020 

12:00:00 AM EDT        1.0 {tablet}                      active               Ce

simone 10 MG eCW1 ([facility])

 

                    Citalopram 10 MG Oral Tablet [Celexa] Celexa 10 MG Celexa 10

 MG        2020 

12:00:00 AM EDT        1.0 {tablet}                      active               Ce

simone 10 MG eCW1 ([facility])

 

                    Citalopram 10 MG Oral Tablet [Celexa] Celexa 10 MG Celexa 10

 MG        2020 

12:00:00 AM EDT        1.0 {tablet}                      active               Ce

simone 10 MG eCW1 ([facility])

 

          5-325 mg            2020 12:00:00 AM EDT tablet    20           

       TAKE ONE TABLET BY MOUTH THREE

 TIMES A DAY AS NEEDED FOR PAIN, MAXIMUM DAILY DOSE = THREE TABLETS TAKE ONE 

TABLET BY MOUTH THREE TIMES A DAY AS NEEDED FOR PAIN, MAXIMUM DAILY DOSE = THREE
 TABLETS     SOLD: 2020                                        [facility]

s



                                                                                
                                                                                
        



Insurance Providers

          



             Payer name   Policy type / Coverage type Policy ID    Covered party

 ID Covered 

party's relationship to lyman Policy Lyman             Plan Information

 

          [employer] [place] [address]           [id number]           [place]      

           [id number]

 

          [employer] [place] [employer]           [id number]           [facility]      

           [id number]

 

          [facility] [employer] B         [id number]           P                   [place]

[id number]

 

                    [employer] [id number]        

                       

[id number]

 

                    [employer] [id number]        

                       

[id number]

 

                    [employer] [id number]        

                       

[id number]

 

                    [employer] [id number]        

                       

[id number]

 

                    [employer] [id number]        

                       

[id number]

 

                    [employer] [id number]        

                       

[id number]

 

          [employer] [place] [employer] [id number]           Family Dependent 

          [id number]

 

          BCBS OF [place] [employer]        [id number]           SPO                 TNY

[id number]

 

          [employer]         [id number]           P                   TNY

[id number]

 

          [employer]         [id number]           P                   RWB

[id number]

 

          [facility] 303/803           [id number]           HU2          

       [id number]

 

          [employer] [place] 332/834           [id number]           HU2             

    [id number]

 

          [employer] [place] [id number]           [id number]           2             

    [id number]

 

          [employer]        [id number]           SPO                 [id number]

[id number]

 

          [employer]        [id number]                               [id number]

[id number]



                                                                                
                                                                                
                                                                                
                  



Problems, Conditions, and Diagnoses

          



           Code       Display Name Description Problem Type Effective Dates Data

 Source(s)

 

             F17.210      35792400     Nicotine dependence, cigarettes, uncompli

cated Problem      

2020 12:00:00 AM EDT              eCW1 ([facility])

 

             F17.210      [id number]     Nicotine dependence, cigarettes, uncompli

cated Problem      

2020 12:00:00 AM EDT              eCW1 ([facility])

 

             Z34.80       Pregnancy care Supervision of other normal pregnancy P

roblem      2020 

12:00:00 AM EST                         eCW1 ([facility])

 

             Z34.80       Pregnancy care Supervision of other normal pregnancy P

roblem      2020 

12:00:00 AM EST                         eCW1 ([facility])



                                                                                
                                                



Surgeries/Procedures

          



             Procedure    Description  Date         Indications  Data Source(s)

 

             URINE PREGNANCY TEST              2020 12:00:00 AM EST       

       eCW1 ([facility])

 

             SUBSEQUENT PRENATAL CARE VISIT              2020 12:00:00 AM 

EDT              eCW1 ([facility])

 

             OB Visit                  2020 12:00:00 AM EDT              e

CW1 ([facility])

 

             INJECTION/IM              04/10/2020 12:00:00 AM EDT              e

CW1 ([facility])

 

             THER/PROPH/DIAG INJ, SC/IM              04/10/2020 12:00:00 AM EDT 

             eCW1 ([facility])

 

             RHO(D) IMMUNE GLOBULIN HUMAN FULL-DOSE IM              04/10/2020 1

2:00:00 AM EDT              eCW1 

([facility])



                                                                                
                                                          



Results

          



                    ID                  Date                Data Source

 

                    CHGCTV - CHLAMYDIA, GC & TRICH AMP (Microbiology) 2020

 12:00:00 AM EDT 

eCW1 ([facility])









          Name      Value     Range     Interpretation Code Description Data Barbara

rce(s) Supporting 

Document(s)

 

                      NOT DETECTED NEGATIVE              Trichomonas vaginalis (

AMP) eCW1 ([facility])                           









                                        Procedure

 

                                          



                                                                                
                  



Social History

          



           Code       Duration   Value      Status     Description Data Source(s

)

 

           Smoking    2020 12:00:00 AM EST Current Smoker completed  Curre

nt Smoker eCW1 

([facility])

 

           Smoking    2020 12:00:00 AM EST Current Smoker completed  Curre

nt Smoker eCW1 

([facility])

 

           Smoking    2020 12:00:00 AM EST Current Smoker completed  Curre

nt Smoker eCW1 

([facility])

 

           Smoking    2020 12:00:00 AM EDT Current Smoker completed  Curre

nt Smoker eCW1 

([facility])

 

           Smoking    2020 12:00:00 AM EDT Current Smoker completed  Curre

nt Smoker eCW1 

([facility])

 

           Smoking    2020 12:00:00 AM EDT Current Smoker completed  Curre

nt Smoker eCW1 

([facility])



                                                                                
                                                                    



Vital Signs

          



                    ID                  Date                Data Source

 

                    UNK                                      









           Name       Value      Range      Interpretation Code Description Data

 Source(s)

 

           Diastolic blood pressure 76 mm[Hg]                        76 mm[Hg]  

eCW1 ([facility])

 

           Systolic blood pressure 111 mm[Hg]                       111 mm[Hg] e

CW1 ([facility])

 

           Body temperature 97.9 [degF]                       97.9 [degF] eCW1 (

[facility])

 

           Respiratory rate 20 /min                          20 /min    eCW1 ([facility])

 

           Heart rate 76 /min                          76 /min    eCW1 ([facility])

 

           Body mass index (BMI) [Ratio] 27.29 kg/m2                       27.29

 kg/m2 eCW1 ([facility])

 

           Body height                                   [in_i]    eCW1 ([facility])

 

           Body weight 164 [lb_av]                       164 [lb_av] eCW1 ([facility])

 

           Diastolic blood pressure 70 mm[Hg]                        70 mm[Hg]  

eCW1 ([facility])

 

           Systolic blood pressure 126 mm[Hg]                       126 mm[Hg] e

CW1 ([facility])

 

           Body mass index (BMI) [Ratio] 27.85 kg/m2                       27.85

 kg/m2 eCW1 ([facility])

 

           Body height                                   [in_i]    eCW1 ([facility])

 

           Body weight 167.4 [lb_av]                       167.4 [lb_av] eCW1 ([facility])

 

           Diastolic blood pressure 74 mm[Hg]                        74 mm[Hg]  

eCW1 ([facility])

 

           Systolic blood pressure 126 mm[Hg]                       126 mm[Hg] e

CW1 ([facility])

 

           Body mass index (BMI) [Ratio] 45.787 kg/m2                       45.7

87 kg/m2 eCW1 ([facility])

 

           Body height                                   [in_i]    eCW1 ([facility])

 

           Body weight 197 [lb_av]                       197 [lb_av] eCW1 ([facility])

 

           Diastolic blood pressure 60 mm[Hg]                        60 mm[Hg]  

eCW1 ([facility])

 

           Systolic blood pressure 136 mm[Hg]                       136 mm[Hg] e

CW1 ([facility])

 

           Body mass index (BMI) [Ratio] 45.09 kg/m2                       45.09

 kg/m2 eCW1 ([facility])

 

           Body height                                   [in_i]    eCW1 ([facility])

 

           Body weight 194 [lb_av]                       194 [lb_av] eCW1 ([facility])

 

           Diastolic blood pressure 72 mm[Hg]                        72 mm[Hg]  

eCW1 ([facility])

 

           Systolic blood pressure 130 mm[Hg]                       130 mm[Hg] e

CW1 ([facility])

 

           Body mass index (BMI) [Ratio] 45.136 kg/m2                       45.1

36 kg/m2 eCW1 ([facility])

 

           Body height                                   [in_i]    eCW1 ([facility])

 

           Body weight 194.2 [lb_av]                       194.2 [lb_av] eCW1 ([facility])

 

           Diastolic blood pressure 68 mm[Hg]                        68 mm[Hg]  

eCW1 ([facility])

 

           Systolic blood pressure 122 mm[Hg]                       122 mm[Hg] e

CW1 ([facility])

 

           Body mass index (BMI) [Ratio] 44.857 kg/m2                       44.8

57 kg/m2 eCW1 ([facility])

 

           Body height                                   [in_us]   eCW1 ([facility])

 

           Body weight Measured 193 [lb_av]                       193 [lb_av] eC

W1 ([facility])

 

           Diastolic blood pressure 68 mm[Hg]                        68 mm[Hg]  

eCW1 ([facility])

 

           Systolic blood pressure 116 mm[Hg]                       116 mm[Hg] e

CW1 ([facility])

 

           Body mass index (BMI) [Ratio] 44.16 kg/m2                       44.16

 kg/m2 eCW1 ([facility])

 

           Body height                                   [in_us]   eCW1 ([facility])

 

           Body weight Measured 190 [lb_av]                       190 [lb_av] eC

W1 ([facility])

 

           Diastolic blood pressure 64 mm[Hg]                        64 mm[Hg]  

eCW1 ([facility])

 

           Systolic blood pressure 110 mm[Hg]                       110 mm[Hg] e

CW1 ([facility])

 

           Body mass index (BMI) [Ratio] 31.08 kg/m2                       31.08

 kg/m2 eCW1 ([facility])

 

           Body height                                   [in_us]   eCW1 ([facility])

 

           Body weight Measured 186.8 [lb_av]                       186.8 [lb_av

] eCW1 ([facility])

 

           Diastolic blood pressure 68 mm[Hg]                        68 mm[Hg]  

eCW1 ([facility])

 

           Systolic blood pressure 112 mm[Hg]                       112 mm[Hg] e

CW1 ([facility])

 

           Body mass index (BMI) [Ratio] 30.12 kg/m2                       30.12

 kg/m2 eCW1 ([facility])

 

           Body height                                   [in_us]   eCW1 ([facility])

 

           Body weight Measured 181 [lb_av]                       181 [lb_av] eC

W1 ([facility])

 

           Diastolic blood pressure 76 mm[Hg]                        76 mm[Hg]  

eCW1 ([facility])

 

           Systolic blood pressure 118 mm[Hg]                       118 mm[Hg] e

CW1 ([facility])

 

           Body mass index (BMI) [Ratio] 29.75 kg/m2                       29.75

 kg/m2 eCW1 ([facility])

 

           Body height                                   [in_us]   eCW1 ([facility])

 

           Body weight Measured 178.8 [lb_av]                       178.8 [lb_av

] eCW1 ([facility])



                                                                                
                  



Patient Treatment Plan of Care

          



             Planned Activity Planned Date Details      Description  Data Source

(s)

 

             Fluticasone Propionate 50 MCG/ACT 2020 12:00:00 AM EST       

                    eCW1 ([facility])

 

                    Amoxicillin 875 MG / Clavulanate 125 MG Oral Tablet 20 12:00:00 AM EST  

                                                    eCW1 ([facility])

 

             Fluticasone Propionate 50 MCG/ACT 2020 12:00:00 AM EST       

                    eCW1 ([facility])

 

                    Amoxicillin 875 MG / Clavulanate 125 MG Oral Tablet 20 12:00:00 AM EST  

                                                    eCW1 ([facility])

 

             Fluticasone Propionate 50 MCG/ACT 2020 12:00:00 AM EST       

                    eCW1 ([facility])

 

                    Amoxicillin 875 MG / Clavulanate 125 MG Oral Tablet 20 12:00:00 AM EST  

                                                    eCW1 ([facility])

 

             Citalopram 10 MG Oral Tablet [Celexa] 2020 12:00:00 AM EDT   

                        eCW1 

([facility])
Summarization of Episode Note

                             Created on: 2020



MERLE GRISSOM

External Reference #: [id number]

: 1985

Sex: Female



Demographics





                          Address                   [address] [place]  [address]

 

                          Home Phone                [phone]

 

                          Preferred Language        Unknown

 

                          Marital Status            Unknown

 

                          Evangelical Affiliation     Unknown

 

                          Race                      White

 

                          Ethnic Group              Not  or 





Author





                          Author                    [facility]

ems

 

                          Organization              [facility]

ems

 

                          Address                   Unknown

 

                          Phone                     Unavailable







Support





                Name            Relationship    Address         Phone

 

                    MERLE GRISSOM                [address] [place]  [address] [phone]

 

                    Bautista Grissom                [address] [place]  [address]                 [phone]







Care Team Providers





                    Care Team Member Name Role                Phone

 

                    CeciliamunirLauryn roth Unavailable         [phone]







PROBLEMS





          Type      Condition ICD9-CM Code UZY93-OJ Code Onset Dates Condition S

tatus SNOMED 

Code                                    Notes

 

        Problem Rash and other nonspecific skin eruption         R21            

 Active  539800693  

 

        Problem Malaise and fatigue         R53.81          Active  914422445  

 

        Problem Palpitations         R00.2           Active  01170863  

 

        Problem Breast lump         N63             Active  05224873  

 

        Problem Backache, unspecified         M54.9           Active  864584526 

 

 

        Problem Family history of breast cancer         Z80.3           Active  

120191899  

 

        Problem Weight gain         R63.5           Active  7407366  

 

        Problem Tobacco use disorder         F17.200         Active  52524468  

 

        Problem Anxiety         F41.9           Active  04307922  

 

        Problem Menorrhagia with irregular cycle         N92.1           Active 

 132150466  

 

        Problem Supervision of other normal pregnancy         Z34.80          Ac

tive  409660985  

 

        Problem Depression, unspecified depression type         F32.9           

Active  99110036  

 

          Problem   Nicotine dependence, cigarettes, uncomplicated           F17

.210             Active    

25782232                                 

 

        Problem GERD (gastroesophageal reflux disease)         K21.9           A

ctive  185180292  

 

        Problem Goiter          E04.9           Active  5187453  

 

        Problem Chronic fatigue         R53.82          Active  51617350  

 

        Problem Thrombocytopenia         D69.6           Active  003954412  

 

           Problem    Sinusitis, unspecified chronicity, unspecified location   

         J32.9                 Active

                          [id number]                   







ALLERGIES

No Known Allergies



ENCOUNTERS from 1985 to 2020





             Encounter    Location     Date         Provider     Diagnosis

 

                          [facility] [address] [place] [address] 

[id number]                  [facility] Encounter for Dep

o-Provera 

contraception Z30.42







IMMUNIZATIONS





                Vaccine         Route           Administration Date Status

 

                    Depo-Provera 150mg/1mL (Medroxy-Progestrone Acetate) IM Intr

amuscular    2020                                    Administered

 

                    Depo-Provera 150mg/1mL (Medroxy-Progestrone Acetate) IM Intr

amuscular    2018                                    Administered

 

                    Depo-Provera 150mg/1mL (Medroxy-Progestrone Acetate) IM Intr

amuscular    2018                                    Administered

 

                    Depo-Provera 150mg/1mL (Medroxy-Progestrone Acetate) IM Intr

amuscular    2018                                    Administered

 

                    Depo-Provera 150mg/1mL (Medroxy-Progestrone Acetate) IM Intr

amuscular    Oct 24, 

2017                                    Administered

 

                    Depo-Provera 150mg/1mL (Medroxy-Progestrone Acetate) IM Intr

amuscular    Aug 01, 

2017                                    Administered

 

                RHo (D) Immune Globulin 300mcg/1.5mL (RhoGAM) IM Intramuscular A

pril 10, 2020  

Administered

 

                    Depo-Provera 150mg/1mL (Medroxy-Progestrone Acetate) IM Intr

amuscular    May 05, 

2017                                    Administered

 

                Depo provera 150mg (Medroxy-progestrone acetate) IM Intramuscula

r Feb 10, 2017    

Administered

 

                Influenza (6mo & up) Fluzone Unknown         2017    Ref

used







SOCIAL HISTORY

Tobacco Use:



                    Social History Observation Description         Date

 

                    Details (start date - stop date) Current Smoker       



Sex Assigned At Birth:



                          Social History Observation Description

 

                          Sex Assigned At Birth     Unknown



Education:



                    Question            Answer              Notes

 

                    Level of Education: High School          



Audit



                    Question            Answer              Notes

 

                    Total Score:        1                    

 

                    Interpretation:     Alcohol Education    



Confucianism:



                    Question            Answer              Notes

 

                    Confucianism                                No Confucianist beliefs

 that would impact health care.



Drug and Alcohol



                    Question            Answer              Notes

 

                    Total Score:        0                    

 

                    Interpretation:     No problems reported  



Tobacco Use:



                    Question            Answer              Notes

 

                    Are you a:          current smoker      5 daily

 

                    Additional Findings: Tobacco User Moderate cigarette smoker 

(10-19 cigs/day)  

 

                    Smoking Cessation Information Given 2020           

 

                    Patient counseled on the dangers of tobacco use and urged to

 quit: 2020           

 

                    How many cigarettes a day do you smoke? 5 or less           

 

 

                    Are you interested in quitting? Thinking about quitting  

 

                    Counseled the patient on smoking cessation, education provid

ed 2019           







REASON FOR REFERRAL

No Information



VITAL SIGNS

No information



MEDICATIONS





           Medication SIG (Take, Route, Frequency, Duration) Notes      Start Da

te End Date   

Status

 

           Flinstones Gummies Omega-3 DHA - as directed Orally                  

                Active

 

           Celexa 10 MG 1 tablet Orally Once a day for 30 day(s)            13 J

2020            Active

 

                          Amoxicillin-Pot Clavulanate 875-125 MG 1 tablet Orally

 every 12 hrs for 10 

day(s)                                              Active

 

                          Fluticasone Propionate 50 MCG/ACT 1 spray in each nost

ril Nasally bid for 30 

day(s)                                              Active







PROCEDURES





                Procedure       Date Ordered    Result          Body Site

 

                URINE PREGNANCY TEST 2020      Negative         







RESULTS

No Results



REASON FOR VISIT

depo nurse



MEDICAL (GENERAL) HISTORY





                    Type                Description         Date

 

                    Medical History     Pregnancy 2          

 

                    Medical History     Depression           

 

                    Medical History     small goiter: seen by NIKHIL Lakhani .  

 

                    Medical History     Iron deficiency anemia  

 

                    Medical History     hypothyroidism       

 

                    Surgical History    Appendectomy        

 

                    Surgical History     x1        

 

                    Surgical History               2020

 

                    Hospitalization History Surgery              

 

                    Hospitalization History childbirth           







Goals Section

No Information



Health Concerns

No Information



MEDICAL EQUIPMENT

No Information



MENTAL STATUS

No Information



FUNCTIONAL STATUS

No Information



ASSESSMENTS





             Encounter Date Diagnosis    Assessment Notes Treatment Notes Treatm

ent Clinical 

Notes

 

                    Encounter for Depo-Provera contraception (ICD-10

 - Z30.42)                 



                                        











PLAN OF TREATMENT

Medication



                Medication Name Sig             Start Date      Stop Date

 

                          Fluticasone Propionate 50 MCG/ACT 1 spray in each nost

ril Nasally bid for 30 

day(s)                                   

 

                          Amoxicillin-Pot Clavulanate 875-125 MG 1 tablet Orally

 every 12 hrs for 10 

day(s)                                   



Treatment Notes



                          Test Name                 Order Date

 

                          Medication: Depo-Provera 150mg/1mL IM (Medroxyprogeste

xochilt Acetate) 2020



Next Appt



                                        Details

 

                                        21-21 Depo Injection Reason:







Insurance Providers





             Payer Name   Payer Address Payer Phone  Insured Name Patient Relati

onship to 

Insured                   Coverage Start Date       Coverage End Date

 

                    [employer] [place] [employer]  [phone] [facility] [employer] [place] [address] 

[phone]    MERLE GRISSOM
Summarization of Episode Note

                             Created on: 2020



MERLE GRISSOM

External Reference #: [id number]

: 1985

Sex: Female



Demographics





                          Address                   [address] [place]  [address]

 

                          Home Phone                [phone]

 

                          Preferred Language        Unknown

 

                          Marital Status            Unknown

 

                          Congregation Affiliation     Unknown

 

                          Race                      White

 

                          Ethnic Group              Not  or 





Author





                          Author                    [facility]

ems

 

                          Organization              [facility]

ems

 

                          Address                   Unknown

 

                          Phone                     Unavailable







Support





                Name            Relationship    Address         Phone

 

                    MERLE GRISSOM   GUAR                [address] [place]  [address] [phone]

 

                    Bautista Grissom                [address] [place], [place]  [phone]







Care Team Providers





                    Care Team Member Name Role                Phone

 

                    Maame Rock  Unavailable         [phone]







PROBLEMS





          Type      Condition ICD9-CM Code XDF09-GB Code Onset Dates Condition S

tatus SNOMED 

Code                                    Notes

 

        Problem Rash and other nonspecific skin eruption         R21            

 Active  637775421  

 

        Problem Malaise and fatigue         R53.81          Active  507463338  

 

        Problem Palpitations         R00.2           Active  82559352  

 

        Problem Breast lump         N63             Active  91887168  

 

        Problem Backache, unspecified         M54.9           Active  736325294 

 

 

        Problem Family history of breast cancer         Z80.3           Active  

622436139  

 

        Problem Weight gain         R63.5           Active  2219473  

 

        Problem Tobacco use disorder         F17.200         Active  03061911  

 

        Problem Anxiety         F41.9           Active  56437867  

 

        Problem Menorrhagia with irregular cycle         N92.1           Active 

 617084698  

 

        Problem Supervision of other normal pregnancy         Z34.80          Ac

tive  340505861  

 

        Problem Depression, unspecified depression type         F32.9           

Active  90513651  

 

          Problem   Nicotine dependence, cigarettes, uncomplicated           F17

.210             Active    

35634854                                 

 

        Problem GERD (gastroesophageal reflux disease)         K21.9           A

ctive  830844334  

 

        Problem Goiter          E04.9           Active  0085398  

 

        Problem Chronic fatigue         R53.82          Active  27464353  

 

        Problem Thrombocytopenia         D69.6           Active  622930105  

 

           Problem    Sinusitis, unspecified chronicity, unspecified location   

         J32.9                 Active

                          16536312                   







ALLERGIES

No Known Allergies



ENCOUNTERS from 1983 to 2020





             Encounter    Location     Date         Provider     Diagnosis

 

                [facility] [address] [place] [address]     Maame Rock                                  







IMMUNIZATIONS





                Vaccine         Route           Administration Date Status

 

                RHo (D) Immune Globulin 300mcg/1.5mL (RhoGAM) IM Intramuscular A

pril 10, 2020  

Administered

 

                    Depo-Provera 150mg/1mL (Medroxy-Progestrone Acetate) IM Intr

amuscular    2018                                    Administered

 

                    Depo-Provera 150mg/1mL (Medroxy-Progestrone Acetate) IM Intr

amuscular    2018                                    Administered

 

                    Depo-Provera 150mg/1mL (Medroxy-Progestrone Acetate) IM Intr

amuscular    2018                                    Administered

 

                    Depo-Provera 150mg/1mL (Medroxy-Progestrone Acetate) IM Intr

amuscular    Oct 24, 

2017                                    Administered

 

                    Depo-Provera 150mg/1mL (Medroxy-Progestrone Acetate) IM Intr

amuscular    Aug 01, 

2017                                    Administered

 

                    Depo-Provera 150mg/1mL (Medroxy-Progestrone Acetate) IM Intr

amuscular    May 05, 

2017                                    Administered

 

                Depo provera 150mg (Medroxy-progestrone acetate) IM Intramuscula

r Feb 10, 2017    

Administered

 

                Influenza (6mo & up) Fluzone Unknown         2017    Ref

used







SOCIAL HISTORY

Tobacco Use:



                    Social History Observation Description         Date

 

                    Details (start date - stop date) Current Smoker       



Sex Assigned At Birth:



                          Social History Observation Description

 

                          Sex Assigned At Birth     Unknown



Education:



                    Question            Answer              Notes

 

                    Level of Education: High School          



Audit



                    Question            Answer              Notes

 

                    Total Score:        1                    

 

                    Interpretation:     Alcohol Education    



Yazidism:



                    Question            Answer              Notes

 

                    Yazidism                                No Rastafarian beliefs

 that would impact health care.



Drug and Alcohol



                    Question            Answer              Notes

 

                    Total Score:        0                    

 

                    Interpretation:     No problems reported  



Tobacco Use:



                    Question            Answer              Notes

 

                    Are you a:          current smoker      5 daily

 

                    Additional Findings: Tobacco User Moderate cigarette smoker 

(10-19 cigs/day)  

 

                    Smoking Cessation Information Given 2020           

 

                    Patient counseled on the dangers of tobacco use and urged to

 quit: 2020           

 

                    How many cigarettes a day do you smoke? 5 or less           

 

 

                    Are you interested in quitting? Thinking about quitting  

 

                    Counseled the patient on smoking cessation, education provid

ed 2019           







REASON FOR REFERRAL

No Information



VITAL SIGNS

No information



MEDICATIONS





           Medication SIG (Take, Route, Frequency, Duration) Notes      Start Da

te End Date   

Status

 

           Flinstones Gummies Omega-3 DHA - as directed Orally                  

                Active

 

           Celexa 10 MG 1 tablet Orally Once a day for 30 day(s)            13 J

2020            Active

 

                          Amoxicillin-Pot Clavulanate 875-125 MG 1 tablet Orally

 every 12 hrs for 10 

day(s)                                              Active

 

                          Fluticasone Propionate 50 MCG/ACT 1 spray in each nost

ril Nasally bid for 30 

day(s)                                              Active







PROCEDURES

No Information



RESULTS

No Results



REASON FOR VISIT

sinus pressure/ headache



MEDICAL (GENERAL) HISTORY





                    Type                Description         Date

 

                    Medical History     Pregnancy 2          

 

                    Medical History     Depression           

 

                    Medical History     small goiter: seen by NIKHIL Lakhani .  

 

                    Medical History     Iron deficiency anemia  

 

                    Medical History     hypothyroidism       

 

                    Surgical History    Appendectomy        

 

                    Surgical History     x1        

 

                    Surgical History               2020

 

                    Hospitalization History Surgery              

 

                    Hospitalization History childbirth           







Goals Section

No Information



Health Concerns

No Information



MEDICAL EQUIPMENT

No Information



MENTAL STATUS

No Information



FUNCTIONAL STATUS

No Information



ASSESSMENTS

No Information



PLAN OF TREATMENT

Medication



                Medication Name Sig             Start Date      Stop Date

 

                          Fluticasone Propionate 50 MCG/ACT 1 spray in each nost

ril Nasally bid for 30 

day(s)                                   

 

                          Amoxicillin-Pot Clavulanate 875-125 MG 1 tablet Orally

 every 12 hrs for 10 

day(s)                                   



Next Appt



                                        Details

 

                                        Provider Name:Lauryn Ventura, 

2020 01:00:00 PM, [address] [place], [address], [phone]







Insurance Providers





             Payer Name   Payer Address Payer Phone  Insured Name Patient Relati

onship to 

Insured                   Coverage Start Date       Coverage End Date

 

                    BCDEN[employer] BISHOP  [address] [facility] [employer] [place] [address] 

[phone]    MERLE GRISSOM
Summarization of Episode Note

                             Created on: 2020



MERLE GRISSOM

External Reference #: [id number]

: 1985

Sex: Female



Demographics





                          Address                   [address] [place]  [address]

 

                          Home Phone                [phone]

 

                          Preferred Language        Unknown

 

                          Marital Status            Unknown

 

                          Synagogue Affiliation     Unknown

 

                          Race                      White

 

                          Ethnic Group              Not  or 





Author





                          Author                    [facility]

ems

 

                          Organization              [facility]

ems

 

                          Address                   Unknown

 

                          Phone                     Unavailable







Support





                Name            Relationship    Address         Phone

 

                    MERLE GRISSOM   GUAR                [address] [place]  [address] [phone]

 

                    Bautista Grissom                [address] [place], [place]  [address] [phone]







Care Team Providers





                    Care Team Member Name Role                Phone

 

                    Shweta  Maame  Unavailable         [phone]







PROBLEMS





          Type      Condition ICD9-CM Code FYF84-IO Code Onset Dates Condition S

tatus SNOMED 

Code                                    Notes

 

        Problem Rash and other nonspecific skin eruption         R21            

 Active  804920323  

 

        Problem Malaise and fatigue         R53.81          Active  184695798  

 

        Problem Palpitations         R00.2           Active  88486643  

 

        Problem Breast lump         N63             Active  54386870  

 

        Problem Backache, unspecified         M54.9           Active  664628782 

 

 

        Problem Family history of breast cancer         Z80.3           Active  

942706640  

 

        Problem Weight gain         R63.5           Active  0373638  

 

        Problem Tobacco use disorder         F17.200         Active  46639291  

 

        Problem Anxiety         F41.9           Active  01518819  

 

        Problem Menorrhagia with irregular cycle         N92.1           Active 

 252099041  

 

        Problem Supervision of other normal pregnancy         Z34.80          Ac

tive  695507100  

 

        Problem Depression, unspecified depression type         F32.9           

Active  77262155  

 

          Problem   Nicotine dependence, cigarettes, uncomplicated           F17

.210             Active    

40653842                                 

 

        Problem GERD (gastroesophageal reflux disease)         K21.9           A

ctive  681615171  

 

        Problem Goiter          E04.9           Active  1708396  

 

        Problem Chronic fatigue         R53.82          Active  35429713  

 

        Problem Thrombocytopenia         D69.6           Active  221273950  

 

           Problem    Sinusitis, unspecified chronicity, unspecified location   

         J32.9                 Active

                          83855272                   







ALLERGIES

No Known Allergies



ENCOUNTERS from 1985 to 2020





             Encounter    Location     Date         Provider     Diagnosis

 

                [facility] [address] [place] [address]     Maame Rock                                 Other acute recurrent sinusitis J01.81







IMMUNIZATIONS





                Vaccine         Route           Administration Date Status

 

                    Depo-Provera 150mg/1mL (Medroxy-Progestrone Acetate) IM Intr

amuscular    2020                                    Administered

 

                    Depo-Provera 150mg/1mL (Medroxy-Progestrone Acetate) IM Intr

amuscular    2018                                    Administered

 

                    Depo-Provera 150mg/1mL (Medroxy-Progestrone Acetate) IM Intr

amuscular    2018                                    Administered

 

                    Depo-Provera 150mg/1mL (Medroxy-Progestrone Acetate) IM Intr

amuscular    2018                                    Administered

 

                    Depo-Provera 150mg/1mL (Medroxy-Progestrone Acetate) IM Intr

amuscular    Oct 24, 

2017                                    Administered

 

                    Depo-Provera 150mg/1mL (Medroxy-Progestrone Acetate) IM Intr

amuscular    Aug 01, 

2017                                    Administered

 

                RHo (D) Immune Globulin 300mcg/1.5mL (RhoGAM) IM Intramuscular A

pril 10, 2020  

Administered

 

                    Depo-Provera 150mg/1mL (Medroxy-Progestrone Acetate) IM Intr

amuscular    May 05, 

2017                                    Administered

 

                Depo provera 150mg (Medroxy-progestrone acetate) IM Intramuscula

r Feb 10, 2017    

Administered

 

                Influenza (6mo & up) Fluzone Unknown         2017    Ref

used







SOCIAL HISTORY

Tobacco Use:



                    Social History Observation Description         Date

 

                    Details (start date - stop date) Current Smoker       



Sex Assigned At Birth:



                          Social History Observation Description

 

                          Sex Assigned At Birth     Unknown



Education:



                    Question            Answer              Notes

 

                    Level of Education: High School          



Audit



                    Question            Answer              Notes

 

                    Total Score:        1                    

 

                    Interpretation:     Alcohol Education    



Alevism:



                    Question            Answer              Notes

 

                    Alevism                                No Mormon beliefs

 that would impact health care.



Drug and Alcohol



                    Question            Answer              Notes

 

                    Total Score:        0                    

 

                    Interpretation:     No problems reported  



Tobacco Use:



                    Question            Answer              Notes

 

                    Are you a:          current smoker      5 daily

 

                    Additional Findings: Tobacco User Moderate cigarette smoker 

(10-19 cigs/day)  

 

                    Smoking Cessation Information Given 2020           

 

                    Patient counseled on the dangers of tobacco use and urged to

 quit: 2020           

 

                    How many cigarettes a day do you smoke? 5 or less           

 

 

                    Are you interested in quitting? Thinking about quitting  

 

                    Counseled the patient on smoking cessation, education provid

ed 2019           







REASON FOR REFERRAL

No Information



VITAL SIGNS





                    Weight              164 lbs             

 

                    Height              5'5" in             

 

                    BMI                 27.29 kg/m2         17 Nov, 2020

 

                    Heart Rate          76 /min             

 

                    Respiratory Rate    20 /min             

 

                    Temperature         97.9 degrees Fahrenheit 

 

                    Oximetry            98                  

 

                    Blood pressure systolic 111 mm Hg           

 

                    Blood pressure diastolic 76 mm Hg            







MEDICATIONS





           Medication SIG (Take, Route, Frequency, Duration) Notes      Start Da

te End Date   

Status

 

           Flinstones Gummies Omega-3 DHA - as directed Orally                  

                Active

 

           Celexa 10 MG 1 tablet Orally Once a day for 30 day(s)            13 2020            Active

 

                          Amoxicillin-Pot Clavulanate 875-125 MG 1 tablet Orally

 every 12 hrs for 10 

day(s)                                              Active

 

                          Fluticasone Propionate 50 MCG/ACT 1 spray in each nost

ril Nasally bid for 30 

day(s)                                              Active







PROCEDURES

No Information



RESULTS

No Results



REASON FOR VISIT

sinus pressure/ drainage



MEDICAL (GENERAL) HISTORY





                    Type                Description         Date

 

                    Medical History     Pregnancy 2          

 

                    Medical History     Depression           

 

                    Medical History     small goiter: seen by NIKHIL Lakhani .  

 

                    Medical History     Iron deficiency anemia  

 

                    Medical History     hypothyroidism       

 

                    Surgical History    Appendectomy        

 

                    Surgical History     x1        

 

                    Surgical History               2020

 

                    Hospitalization History Surgery              

 

                    Hospitalization History childbirth           







Goals Section

No Information



Health Concerns

No Information



MEDICAL EQUIPMENT

No Information



MENTAL STATUS

No Information



FUNCTIONAL STATUS

No Information



ASSESSMENTS





             Encounter Date Diagnosis    Assessment Notes Treatment Notes Treatm

ent Clinical 

Notes

 

                    Other acute recurrent sinusitis (ICD-10 - J01.81

)                 



                                        



Educated patient to use zyrtec for symptomatic treatment 



Advised to call the office if symptoms worsen 



Educated on side effects of Augmentin



Advised to not use Afrin, may use flonase BID for symptom relief

 

              Other                                  Maribel Gaona RN

 BSN, Student- FNP. I was present during 

examination and agree with documentation as entered by NP student







PLAN OF TREATMENT

Medication



                Medication Name Sig             Start Date      Stop Date

 

                          Fluticasone Propionate 50 MCG/ACT 1 spray in each nost

ril Nasally bid for 30 

day(s)                                   

 

                          Amoxicillin-Pot Clavulanate 875-125 MG 1 tablet Orally

 every 12 hrs for 10 

day(s)                                   



Treatment Notes



                    Assessment          Notes               Clinical Notes

 

                    Other acute recurrent sinusitis                     Educated

 patient to use zyrtec for symptomatic

treatmentAdvised to call the office if symptoms worsenEducated on side effects 
of AugmentinAdvised to not use Afrin, may use flonase BID for symptom relief



Next Appt



                                        Details

 

                                        prn Reason:







Insurance Providers





             Payer Name   Payer Address Payer Phone  Insured Name Patient Relati

onship to 

Insured                   Coverage Start Date       Coverage End Date

 

                    [employer]ENITA BISHOP [facility] [phone] [place] [address] 

[phone]    MERLE GRISSOM
No